# Patient Record
Sex: MALE | Race: WHITE | NOT HISPANIC OR LATINO | Employment: UNEMPLOYED | URBAN - METROPOLITAN AREA
[De-identification: names, ages, dates, MRNs, and addresses within clinical notes are randomized per-mention and may not be internally consistent; named-entity substitution may affect disease eponyms.]

---

## 2019-06-11 RX ORDER — IBUPROFEN 800 MG/1
800 TABLET ORAL EVERY 6 HOURS PRN
COMMUNITY
End: 2021-10-29

## 2019-06-11 RX ORDER — FLUTICASONE PROPIONATE 50 MCG
2 SPRAY, SUSPENSION (ML) NASAL DAILY
COMMUNITY
End: 2021-10-29

## 2019-06-11 RX ORDER — PREDNISONE 10 MG/1
10 TABLET ORAL DAILY
COMMUNITY
End: 2021-10-29

## 2019-06-11 RX ORDER — LOSARTAN POTASSIUM AND HYDROCHLOROTHIAZIDE 25; 100 MG/1; MG/1
1 TABLET ORAL DAILY
COMMUNITY
End: 2021-10-29

## 2019-06-11 RX ORDER — AMLODIPINE BESYLATE 5 MG/1
5 TABLET ORAL DAILY
COMMUNITY
End: 2021-10-29

## 2019-06-11 RX ORDER — LOSARTAN POTASSIUM 50 MG/1
50 TABLET ORAL DAILY
COMMUNITY
End: 2021-10-29

## 2019-06-12 ENCOUNTER — OFFICE VISIT (OUTPATIENT)
Dept: NEUROSURGERY | Facility: CLINIC | Age: 49
End: 2019-06-12

## 2019-06-12 VITALS — HEIGHT: 73 IN | BODY MASS INDEX: 37.64 KG/M2 | TEMPERATURE: 97.5 F | WEIGHT: 284 LBS

## 2019-06-12 DIAGNOSIS — M54.14 THORACIC RADICULOPATHY: ICD-10-CM

## 2019-06-12 DIAGNOSIS — M47.816 FACET ARTHRITIS OF LUMBAR REGION: ICD-10-CM

## 2019-06-12 DIAGNOSIS — M48.062 LUMBAR STENOSIS WITH NEUROGENIC CLAUDICATION: ICD-10-CM

## 2019-06-12 DIAGNOSIS — M51.16 HERNIATION OF LUMBAR INTERVERTEBRAL DISC WITH RADICULOPATHY: Primary | ICD-10-CM

## 2019-06-12 DIAGNOSIS — M51.36 DEGENERATIVE DISC DISEASE, LUMBAR: ICD-10-CM

## 2019-06-12 DIAGNOSIS — M54.12 CERVICAL RADICULOPATHY: ICD-10-CM

## 2019-06-12 PROCEDURE — 99243 OFF/OP CNSLTJ NEW/EST LOW 30: CPT | Performed by: NEUROLOGICAL SURGERY

## 2019-06-12 RX ORDER — CLONIDINE HYDROCHLORIDE 0.1 MG/1
TABLET ORAL
Refills: 0 | COMMUNITY
Start: 2019-06-07 | End: 2021-10-29

## 2019-06-12 NOTE — PROGRESS NOTES
Patient: Suresh Johns  : 1970    Primary Care Provider: Iris Barros APRN    Requesting Provider: As above      History    Chief Complaint: Chronic low back pain with sensory alteration.    History of Present Illness: Mr. 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  He was off work for about 1.5 years.  He returned to work and has had ongoing symptoms.  These have been worse since the fall of last year.  Over the last several weeks his symptoms have increased even more.  His pain that extends into the side of the left thigh.  He has numbness globally from the right axilla down to the right foot.  He also complains of numbness and tingling in the ulnar fingers of each hand.  He has no bowel or bladder dysfunction.  His symptoms are worse with walking and standing.  Being off his feet is modestly helpful.    Review of Systems   Constitutional: Positive for activity change. Negative for appetite change, chills, diaphoresis, fatigue, fever and unexpected weight change.   HENT: Negative for congestion, dental problem, drooling, ear discharge, ear pain, facial swelling, hearing loss, mouth sores, nosebleeds, postnasal drip, rhinorrhea, sinus pressure, sinus pain, sneezing, sore throat, tinnitus, trouble swallowing and voice change.    Eyes: Negative for photophobia, pain, discharge, redness, itching and visual disturbance.   Respiratory: Negative for apnea, cough, choking, chest tightness, shortness of breath, wheezing and stridor.    Cardiovascular: Positive for chest pain. Negative for palpitations and leg swelling.   Gastrointestinal: Negative for abdominal distention, abdominal pain, anal bleeding, blood in stool, constipation, diarrhea, nausea, rectal pain and vomiting.   Endocrine: Negative for cold intolerance, heat intolerance, polydipsia, polyphagia and polyuria.  "  Genitourinary: Negative for decreased urine volume, difficulty urinating, discharge, dysuria, enuresis, flank pain, frequency, genital sores, hematuria, penile pain, penile swelling, scrotal swelling, testicular pain and urgency.   Musculoskeletal: Positive for arthralgias, back pain and gait problem. Negative for joint swelling, myalgias, neck pain and neck stiffness.   Skin: Negative for color change, pallor, rash and wound.   Allergic/Immunologic: Negative for environmental allergies, food allergies and immunocompromised state.   Neurological: Positive for weakness. Negative for dizziness, tremors, seizures, syncope, facial asymmetry, speech difficulty, light-headedness, numbness and headaches.   Hematological: Negative for adenopathy. Does not bruise/bleed easily.   Psychiatric/Behavioral: Positive for dysphoric mood. Negative for agitation, behavioral problems, confusion, decreased concentration, hallucinations, self-injury, sleep disturbance and suicidal ideas. The patient is not nervous/anxious and is not hyperactive.        The patient's past medical history, past surgical history, family history, and social history have been reviewed at length in the electronic medical record.    Physical Exam:   Temp 97.5 °F (36.4 °C) (Temporal)   Ht 185.4 cm (73\")   Wt 129 kg (284 lb)   BMI 37.47 kg/m²   CONSTITUTIONAL: Patient is well-nourished, pleasant and appears stated age.  CV: Heart regular rate and rhythm without murmur, rub, or gallop.  PULMONARY: Lungs are clear to ascultation.  MUSCULOSKELETAL:  Straight leg raising is negative.  Benjie's Sign is negative.  ROM in back normal.  Tenderness in the back to palpation is not observed.  NEUROLOGICAL:  Orientation, memory, attention span, language function, and cognition have been examined and are intact.  Strength is intact in the lower extremities to direct testing.  Muscle tone is normal throughout.  Station and gait are normal.  Sensation is diminished " somewhat vaguely in the right chest abdomen and right leg.  Deep tendon reflexes are 2+ in the left knee and left ankle, absent of the right knee, and 1+ at the right ankle.  Connie signs are negative.  Coordination is intact.    Medical Decision Making    Data Review:   MRI of the lumbar spine dated 5/23/2019 demonstrates diffuse degenerative disc disease and facet arthropathy.  Generous disc protrusion central to leftward helps to produce significant stenosis at L3-4.  There is some broad-based disc bulging more prominent rightward at L4-5.  Similar findings are noted more leftward at L5-S1.    Diagnosis:   The patient may well be symptomatic from the disc herniation and stenosis at L3-4.  I cannot readily explain the numbness he harbors from the right axilla downward.    Treatment Options:   I have referred the patient for an MRI of the cervical and thoracic spine to make sure that there are no other lesions that would explain some of his numbness.  Studies apparently have demonstrated aortic aneurysms and he is due to see a CT surgeon as well.       Diagnosis Plan   1. Herniation of lumbar intervertebral disc with radiculopathy     2. Degenerative disc disease, lumbar     3. Facet arthritis of lumbar region (CMS/HCC)     4. Thoracic radiculopathy  MRI Thoracic Spine Without Contrast   5. Cervical radiculopathy  MRI Cervical Spine Without Contrast   6. Lumbar stenosis with neurogenic claudication         Scribed for Ebenezer Latham MD by Mar Stanford CMA on 06/12/2019 at 1:57 PM      I, Dr. Latham, personally performed the services described in the documentation, as scribed in my presence, and it is both accurate and complete.

## 2021-09-08 ENCOUNTER — TELEPHONE (OUTPATIENT)
Dept: NEUROSURGERY | Facility: CLINIC | Age: 51
End: 2021-09-08

## 2021-09-08 NOTE — TELEPHONE ENCOUNTER
RECEIVED REFERRAL THROUGH [facility]. ALREADY AN ESTABLISHED PATIENT, LAST SAW DR. KITCHEN 06- FOR SAME DX. MRI LUMBAR SPINE 08-10-21 IN CHART. PLEASE REVIEW AND ADVISE ON SCHEDULING.

## 2021-10-29 ENCOUNTER — OFFICE VISIT (OUTPATIENT)
Dept: NEUROSURGERY | Facility: CLINIC | Age: 51
End: 2021-10-29

## 2021-10-29 VITALS — WEIGHT: 302.4 LBS | HEIGHT: 73 IN | TEMPERATURE: 97.7 F | BODY MASS INDEX: 40.08 KG/M2

## 2021-10-29 DIAGNOSIS — R29.2: ICD-10-CM

## 2021-10-29 DIAGNOSIS — M51.36 DEGENERATIVE DISC DISEASE, LUMBAR: Primary | ICD-10-CM

## 2021-10-29 DIAGNOSIS — E66.9 OBESITY (BMI 30-39.9): ICD-10-CM

## 2021-10-29 DIAGNOSIS — M48.062 SPINAL STENOSIS, LUMBAR REGION, WITH NEUROGENIC CLAUDICATION: ICD-10-CM

## 2021-10-29 DIAGNOSIS — M51.26 HNP (HERNIATED NUCLEUS PULPOSUS), LUMBAR: ICD-10-CM

## 2021-10-29 DIAGNOSIS — M54.59 MECHANICAL LOW BACK PAIN: ICD-10-CM

## 2021-10-29 PROCEDURE — 99214 OFFICE O/P EST MOD 30 MIN: CPT | Performed by: PHYSICIAN ASSISTANT

## 2021-10-29 RX ORDER — LISINOPRIL 2.5 MG/1
2.5 TABLET ORAL DAILY
COMMUNITY
End: 2021-12-01

## 2021-10-29 RX ORDER — HYDROCODONE BITARTRATE AND ACETAMINOPHEN 7.5; 325 MG/1; MG/1
1 TABLET ORAL 3 TIMES DAILY PRN
COMMUNITY

## 2021-10-29 NOTE — PATIENT INSTRUCTIONS

## 2021-10-29 NOTE — PROGRESS NOTES
Patient: Suresh Johns  : 1970  GENDER: male    Primary Care Provider: Iris Barros APRN    Requesting Provider: As above      History    Chief Complaint:   1. Neck and bilateral arm pain with sensory alteration   2. Low back pain with neurogenic claudication     History of Present Illness: Mr. 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  Patient presents to our service with a protracted course of low back difficulties after sustaining a work-related injury - where he fell off a semitruck in .  Symptoms are predominately confined to his low back and increase rather immediately upon standing/walking, and are improved with sitting/rest.  He denies bilateral lower extremity sensory alteration, however he reports weakness with bilateral hip flexion, and his gait has been off recently.  He additionally endorses posterior neck pain with sensory alteration extending bilaterally down his arms along the ulnar nerve distribution.  Symptoms in his bilateral upper extremities are constant and do not appear to be activity dependent.  He has undergone 12+ sessions of physical therapy without lasting benefit.  He is followed by pain management and receives Norco 7.5 TID.  He denies bowel or bladder dysfunction.  He has no other complaints at this time.    Review of Systems   Constitutional: Positive for activity change. Negative for appetite change, chills, diaphoresis, fatigue, fever and unexpected weight change.   HENT: Positive for ear pain and tinnitus. Negative for congestion, dental problem, drooling, ear discharge, facial swelling, hearing loss, mouth sores, nosebleeds, postnasal drip, rhinorrhea, sinus pressure, sinus pain, sneezing, sore throat, trouble swallowing and voice change.    Eyes: Negative for photophobia, pain, discharge, redness, itching and visual disturbance.   Respiratory: Negative for  apnea, cough, choking, chest tightness, shortness of breath, wheezing and stridor.    Cardiovascular: Positive for palpitations. Negative for chest pain and leg swelling.   Gastrointestinal: Negative for abdominal distention, abdominal pain, anal bleeding, blood in stool, constipation, diarrhea, nausea, rectal pain and vomiting.   Endocrine: Negative for cold intolerance, heat intolerance, polydipsia, polyphagia and polyuria.   Genitourinary: Negative for decreased urine volume, difficulty urinating, dysuria, enuresis, flank pain, frequency, genital sores, hematuria and urgency.   Musculoskeletal: Positive for back pain and neck pain. Negative for arthralgias, gait problem, joint swelling, myalgias and neck stiffness.   Skin: Negative for color change, pallor, rash and wound.   Allergic/Immunologic: Negative for environmental allergies, food allergies and immunocompromised state.   Neurological: Positive for headaches. Negative for dizziness, tremors, seizures, syncope, facial asymmetry, speech difficulty, weakness, light-headedness and numbness.   Hematological: Negative for adenopathy. Does not bruise/bleed easily.   Psychiatric/Behavioral: Positive for sleep disturbance. Negative for agitation, behavioral problems, confusion, decreased concentration, dysphoric mood, hallucinations, self-injury and suicidal ideas. The patient is not nervous/anxious and is not hyperactive.    All other systems reviewed and are negative.      Past Medical History:   Diagnosis Date   • Angina pectoris with documented spasm (HCC)    • Aortic aneurysm (HCC)    • Arthritis    • Back pain    • Back problem    • Cellulitis of right upper limb    • Hypertension    • Low back pain    • Other infective otitis externa, bilateral    • Otitis media, unspecified, right ear    • Radiculopathy     Lumbar Region     Past Surgical History:   Procedure Laterality Date   • NO PAST SURGERIES  10/29/2021       Current Outpatient Medications:   •   "HYDROcodone-acetaminophen (NORCO) 7.5-325 MG per tablet, Take 1 tablet by mouth 3 (Three) Times a Day As Needed for Moderate Pain ., Disp: , Rfl:   •  lisinopril (PRINIVIL,ZESTRIL) 2.5 MG tablet, Take 2.5 mg by mouth Daily., Disp: , Rfl:     Allergies   Allergen Reactions   • Ciprofloxacin Hives and Swelling       The patient's review of systems, past medical history, past surgical history, family history, and social history have been reviewed at length in the electronic medical record.    Physical Exam:   Temp 97.7 °F (36.5 °C)   Ht 185.4 cm (73\")   Wt (!) 137 kg (302 lb 6.4 oz)   BMI 39.90 kg/m²   CONSTITUTIONAL:   - Patient is well-nourished  - Pleasant and appears stated age.  PSYCHIATRIC:  - Normal mood and affect  - Behavior is normal.  - Thought content is normal  HENT:   Head: Normocephalic and Atraumatic.   Eyes:     - Pupils are equal, round, and reactive to light.     - EOM are normal.   CV:   - Regular rate and rhythm on palpable radial pulse   - No murmur appreciated   PULMONARY:   - Speaking in full sentences  - No use of accessory muscles   - Breathing is non-labored   - No wheezing   SKIN:  - Clean, dry and intact   MUSCULOSKELETAL:  - Back tenderness to palpation is not observed.   - ROM in neck/back is normal.  - Straight leg raise is negative   - Benjie's sign is negative   NEUROLOGICAL:  - A&Ox3  - Attention span, language function, and cognition are intact.  - Strength is intact in the upper and lower extremities to direct testing.  - Muscle tone is normal throughout.  - Station and gait are normal.  - Sensation is intact to light touch testing throughout.  - Deep tendon reflexes are 1+ and symmetrical, and difficult to elicit in his right patellar.    - Harrison's Sign is positive bilaterally.  - No clonus is elicited.  - Coordination is intact.    Patient's Body mass index is 39.9 kg/m². indicating that he is obese (BMI >30). Obesity-related health conditions include the following: " hypertension. Obesity is unchanged. BMI is is above average; BMI management plan is completed. We discussed portion control and increasing exercise..         Medical Decision Making    Data Review:   1. MRI Lumbar Spine (8/03/2021):  Independent review of radiographic imaging demonstrates multilevel degenerative disc disease greatest at L3-4 where there is a central to leftward disc protrusion producing significant central stenosis.  At L4-5 there is bilateral foraminal narrowing, and at L5-S1 there is a central broad-based disc protrusion producing moderate central narrowing.    Imaging was reviewed with Dr. Latham.    Diagnosis/Treatment Options:  1. Degenerative disc disease, lumbar  2. Spinal stenosis, lumbar region, with neurogenic claudication  3. Mechanical low back pain  4. HNP (herniated nucleus pulposus), lumbar  5. Harrison's reflex positive  6. Obesity (BMI 30-39.9)    - MRI Cervical Spine Without Contrast       Follow up:  Mr. Johns is seen today with low back complaints and he does appear to be claudicating.  Given the positive Connie's bilaterally I have ordered an MRI of the cervical spine to rule out findings concerning for myelopathy.  Once his cervical spine is cleared we will address his lower extremity complaints.    Ericka Dickens PA-C   10/29/2021   15:07 EDT

## 2021-12-01 ENCOUNTER — DOCUMENTATION (OUTPATIENT)
Dept: NEUROSURGERY | Facility: CLINIC | Age: 51
End: 2021-12-01

## 2021-12-01 ENCOUNTER — OFFICE VISIT (OUTPATIENT)
Dept: NEUROSURGERY | Facility: CLINIC | Age: 51
End: 2021-12-01

## 2021-12-01 VITALS — WEIGHT: 302.2 LBS | TEMPERATURE: 97.1 F | BODY MASS INDEX: 40.05 KG/M2 | HEIGHT: 73 IN

## 2021-12-01 DIAGNOSIS — E66.9 OBESITY (BMI 30-39.9): ICD-10-CM

## 2021-12-01 DIAGNOSIS — M47.12 CERVICAL SPONDYLOSIS WITH MYELOPATHY: Primary | ICD-10-CM

## 2021-12-01 DIAGNOSIS — M51.26 HNP (HERNIATED NUCLEUS PULPOSUS), LUMBAR: ICD-10-CM

## 2021-12-01 DIAGNOSIS — M54.59 MECHANICAL LOW BACK PAIN: ICD-10-CM

## 2021-12-01 DIAGNOSIS — R29.2: ICD-10-CM

## 2021-12-01 DIAGNOSIS — M54.12 CERVICAL RADICULOPATHY: ICD-10-CM

## 2021-12-01 DIAGNOSIS — M48.062 SPINAL STENOSIS, LUMBAR REGION, WITH NEUROGENIC CLAUDICATION: ICD-10-CM

## 2021-12-01 PROCEDURE — 99214 OFFICE O/P EST MOD 30 MIN: CPT | Performed by: PHYSICIAN ASSISTANT

## 2021-12-01 RX ORDER — FAMOTIDINE 10 MG
20 TABLET ORAL
Status: CANCELLED | OUTPATIENT
Start: 2021-12-01

## 2021-12-01 RX ORDER — SODIUM CHLORIDE, SODIUM LACTATE, POTASSIUM CHLORIDE, CALCIUM CHLORIDE 600; 310; 30; 20 MG/100ML; MG/100ML; MG/100ML; MG/100ML
100 INJECTION, SOLUTION INTRAVENOUS CONTINUOUS
Status: CANCELLED | OUTPATIENT
Start: 2021-12-01

## 2021-12-01 NOTE — PROGRESS NOTES
Patient: Suresh Johns  : 1970  GENDER: male    Primary Care Provider: Iris Barros APRN    Requesting Provider: As above      History    Chief Complaint:   1. Neck and bilateral arm pain with sensory alteration   2. 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  Symptoms are predominately confined to his low back and increase rather immediately upon standing/walking, and are improved with sitting/rest.  He denies bilateral lower extremity sensory alteration, however he reports weakness with bilateral hip flexion, and his gait has been off since .  He additionally endorses posterior neck pain with sensory alteration extending bilaterally down his arms along the ulnar nerve distribution.  Symptoms in his bilateral upper extremities are constant and do not appear to be activity dependent.  He has undergone 12+ sessions of physical therapy without lasting benefit.  He is followed by pain management and receives Norco 7.5 TID.  He denies bowel or bladder dysfunction.  He has no other complaints at this time.    Review of Systems   Constitutional: Negative for activity change, appetite change, chills, diaphoresis, fatigue, fever and unexpected weight change.   HENT: Negative for congestion, dental problem, drooling, ear discharge, ear pain, facial swelling, hearing loss, mouth sores, nosebleeds, postnasal drip, rhinorrhea, sinus pressure, sinus pain, sneezing, sore throat, tinnitus, trouble swallowing and voice change.    Eyes: Negative for photophobia, pain, discharge, redness, itching and visual disturbance.   Respiratory: Negative for apnea, cough, choking,  chest tightness, shortness of breath, wheezing and stridor.    Cardiovascular: Negative for chest pain, palpitations and leg swelling.   Gastrointestinal: Negative for abdominal distention, abdominal pain, anal bleeding, blood in stool, constipation, diarrhea, nausea, rectal pain and vomiting.   Endocrine: Negative for cold intolerance, heat intolerance, polydipsia, polyphagia and polyuria.   Genitourinary: Negative for decreased urine volume, difficulty urinating, dysuria, enuresis, flank pain, frequency, genital sores, hematuria and urgency.   Musculoskeletal: Negative for arthralgias, back pain, gait problem, joint swelling, myalgias, neck pain and neck stiffness.   Skin: Negative for color change, pallor, rash and wound.   Allergic/Immunologic: Negative for environmental allergies, food allergies and immunocompromised state.   Neurological: Negative for dizziness, tremors, seizures, syncope, facial asymmetry, speech difficulty, weakness, light-headedness, numbness and headaches.   Hematological: Negative for adenopathy. Does not bruise/bleed easily.   Psychiatric/Behavioral: Negative for agitation, behavioral problems, confusion, decreased concentration, dysphoric mood, hallucinations, self-injury, sleep disturbance and suicidal ideas. The patient is not nervous/anxious and is not hyperactive.    All other systems reviewed and are negative.      Past Medical History:   Diagnosis Date   • Angina pectoris with documented spasm (HCC)    • Aortic aneurysm (HCC)    • Arthritis    • Back pain    • Back problem    • Cellulitis of right upper limb    • Hypertension    • Low back pain    • Other infective otitis externa, bilateral    • Otitis media, unspecified, right ear    • Radiculopathy     Lumbar Region     Past Surgical History:   Procedure Laterality Date   • NO PAST SURGERIES  10/29/2021       Current Outpatient Medications:   •  HYDROcodone-acetaminophen (NORCO) 7.5-325 MG per tablet, Take 1 tablet by mouth 3  "(Three) Times a Day As Needed for Moderate Pain ., Disp: , Rfl:     Allergies   Allergen Reactions   • Ciprofloxacin Hives and Swelling       The patient's review of systems, past medical history, past surgical history, family history, and social history have been reviewed at length in the electronic medical record.    Physical Exam:   Temp 97.1 °F (36.2 °C)   Ht 185.4 cm (72.99\")   Wt (!) 137 kg (302 lb 3.2 oz)   BMI 39.88 kg/m²    CONSTITUTIONAL:   - Patient is well-nourished  - Pleasant and appears stated age.  PSYCHIATRIC:  - Normal mood and affect  - Behavior is normal.  - Thought content is normal  HENT:   Head: Normocephalic and Atraumatic.   Eyes:     - Pupils are equal, round, and reactive to light.     - EOM are normal.   CV:   - Regular rate and rhythm on palpable radial pulse   - No murmur appreciated   PULMONARY:   - Speaking in full sentences  - No use of accessory muscles   - Breathing is non-labored   - No wheezing   SKIN:  - Clean, dry and intact   MUSCULOSKELETAL:  - Back tenderness to palpation is not observed.   - ROM in neck/back is normal.  - Straight leg raise is negative   - Benjie's sign is negative   NEUROLOGICAL:  - A&Ox3  - Attention span, language function, and cognition are intact.  - Strength is intact in the upper and lower extremities to direct testing.  - Muscle tone is normal throughout.  - Station and gait are normal.  - Sensation is intact to light touch testing throughout.  - Deep tendon reflexes are 2-3+ and symmetrical, and difficult to elicit in his right patellar.    - Harrison's Sign is positive bilaterally.  - No clonus is elicited.  - Coordination is intact.    Patient's Body mass index is 39.88 kg/m². indicating that he is morbidly obese (BMI > 40 or > 35 with obesity - related health condition). Obesity-related health conditions include the following: hypertension. Obesity is unchanged. BMI is is above average; BMI management plan is completed. We discussed portion " control and increasing exercise..         Medical Decision Making    Data Review:   1. MRI Cervical Spine (11/12/2021):  Independent review of radiographic imaging demonstrates multilevel degenerative and spondylolytic changes greatest at C5-6 where there is severe central stenosis and malik signal change within the cord.      2. MRI Lumbar Spine (8/03/2021):  Independent review of radiographic imaging demonstrates multilevel degenerative disc disease greatest at L3-4 where there is a central to leftward disc protrusion producing significant central stenosis.  At L4-5 there is bilateral foraminal narrowing, and at L5-S1 there is a central broad-based disc protrusion producing moderate central narrowing.     Imaging was reviewed with Dr. Latham      Diagnosis/Treatment Options:  1. Cervical spondylosis with myelopathy  2. Cervical radiculopathy  3. Harrison's reflex positive  4. Spinal stenosis, lumbar region, with neurogenic claudication  5. Mechanical low back pain  6. HNP (herniated nucleus pulposus), lumbar  7. Obesity (BMI 30-39.9)    - Case Request; Standing  - COVID PRE-OP / PRE-PROCEDURE SCREENING ORDER (NO ISOLATION) - Swab, Nasopharynx; Future  - CBC and Differential; Future  - Basic metabolic panel; Future  - Nicotine Screen, Urine - Urine, Clean Catch; Future  - Case Request       Follow up:  Mr. Johns is seen today in follow-up with an MRI of the cervical spine that reveals severe stenosis at C5-6 with signal change within the cord.  He is noted to be frankly myelopathic on physical exam.  After discussing my findings with Dr. Latham, we have recommended ACDF at C5-6 to address his myelopathy.  I explained the procedure in detail including potential risks, complications, limitations and alternative to surgery.  Pt. Verbalized understanding and has agreed to proceed.  Of note, he has decreased his smoking to 6-8 cigarettes a day and is willing to quit all together.      Ericka Dickens PA-C    12/1/2021   11:34 EST

## 2021-12-01 NOTE — H&P
Patient: Suresh Johns  : 1970  GENDER: male    Primary Care Provider: Iris Barros APRN    Requesting Provider: As above      History    Chief Complaint:   1. Neck and bilateral arm pain with sensory alteration   2. Low back pain with neurogenic claudication     History of Present Illness: Mr. 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  Symptoms are predominately confined to his low back and increase rather immediately upon standing/walking, and are improved with sitting/rest.  He denies bilateral lower extremity sensory alteration, however he reports weakness with bilateral hip flexion, and his gait has been off since .  He additionally endorses posterior neck pain with sensory alteration extending bilaterally down his arms along the ulnar nerve distribution.  Symptoms in his bilateral upper extremities are constant and do not appear to be activity dependent.  He has undergone 12+ sessions of physical therapy without lasting benefit.  He is followed by pain management and receives Norco 7.5 TID.  He denies bowel or bladder dysfunction.  He has no other complaints at this time.    Review of Systems   Constitutional: Negative for activity change, appetite change, chills, diaphoresis, fatigue, fever and unexpected weight change.   HENT: Negative for congestion, dental problem, drooling, ear discharge, ear pain, facial swelling, hearing loss, mouth sores, nosebleeds, postnasal drip, rhinorrhea, sinus pressure, sinus pain, sneezing, sore throat, tinnitus, trouble swallowing and voice change.    Eyes: Negative for photophobia, pain, discharge, redness, itching and visual disturbance.   Respiratory: Negative for apnea, cough, choking,  chest tightness, shortness of breath, wheezing and stridor.    Cardiovascular: Negative for chest pain, palpitations and leg swelling.   Gastrointestinal: Negative for abdominal distention, abdominal pain, anal bleeding, blood in stool, constipation, diarrhea, nausea, rectal pain and vomiting.   Endocrine: Negative for cold intolerance, heat intolerance, polydipsia, polyphagia and polyuria.   Genitourinary: Negative for decreased urine volume, difficulty urinating, dysuria, enuresis, flank pain, frequency, genital sores, hematuria and urgency.   Musculoskeletal: Negative for arthralgias, back pain, gait problem, joint swelling, myalgias, neck pain and neck stiffness.   Skin: Negative for color change, pallor, rash and wound.   Allergic/Immunologic: Negative for environmental allergies, food allergies and immunocompromised state.   Neurological: Negative for dizziness, tremors, seizures, syncope, facial asymmetry, speech difficulty, weakness, light-headedness, numbness and headaches.   Hematological: Negative for adenopathy. Does not bruise/bleed easily.   Psychiatric/Behavioral: Negative for agitation, behavioral problems, confusion, decreased concentration, dysphoric mood, hallucinations, self-injury, sleep disturbance and suicidal ideas. The patient is not nervous/anxious and is not hyperactive.    All other systems reviewed and are negative.      Past Medical History:   Diagnosis Date   • Angina pectoris with documented spasm (HCC)    • Aortic aneurysm (HCC)    • Arthritis    • Back pain    • Back problem    • Cellulitis of right upper limb    • Hypertension    • Low back pain    • Other infective otitis externa, bilateral    • Otitis media, unspecified, right ear    • Radiculopathy     Lumbar Region     Past Surgical History:   Procedure Laterality Date   • NO PAST SURGERIES  10/29/2021       Current Outpatient Medications:   •  HYDROcodone-acetaminophen (NORCO) 7.5-325 MG per tablet, Take 1 tablet by mouth 3  "(Three) Times a Day As Needed for Moderate Pain ., Disp: , Rfl:     Allergies   Allergen Reactions   • Ciprofloxacin Hives and Swelling       The patient's review of systems, past medical history, past surgical history, family history, and social history have been reviewed at length in the electronic medical record.    Physical Exam:   Temp 97.1 °F (36.2 °C)   Ht 185.4 cm (72.99\")   Wt (!) 137 kg (302 lb 3.2 oz)   BMI 39.88 kg/m²    CONSTITUTIONAL:   - Patient is well-nourished  - Pleasant and appears stated age.  PSYCHIATRIC:  - Normal mood and affect  - Behavior is normal.  - Thought content is normal  HENT:   Head: Normocephalic and Atraumatic.   Eyes:     - Pupils are equal, round, and reactive to light.     - EOM are normal.   CV:   - Regular rate and rhythm on palpable radial pulse   - No murmur appreciated   PULMONARY:   - Speaking in full sentences  - No use of accessory muscles   - Breathing is non-labored   - No wheezing   SKIN:  - Clean, dry and intact   MUSCULOSKELETAL:  - Back tenderness to palpation is not observed.   - ROM in neck/back is normal.  - Straight leg raise is negative   - Benjie's sign is negative   NEUROLOGICAL:  - A&Ox3  - Attention span, language function, and cognition are intact.  - Strength is intact in the upper and lower extremities to direct testing.  - Muscle tone is normal throughout.  - Station and gait are normal.  - Sensation is intact to light touch testing throughout.  - Deep tendon reflexes are 2-3+ and symmetrical, and difficult to elicit in his right patellar.    - Harrison's Sign is positive bilaterally.  - No clonus is elicited.  - Coordination is intact.    Patient's Body mass index is 39.88 kg/m². indicating that he is morbidly obese (BMI > 40 or > 35 with obesity - related health condition). Obesity-related health conditions include the following: hypertension. Obesity is unchanged. BMI is is above average; BMI management plan is completed. We discussed portion " control and increasing exercise..         Medical Decision Making    Data Review:   1. MRI Cervical Spine (11/12/2021):  Independent review of radiographic imaging demonstrates multilevel degenerative and spondylolytic changes greatest at C5-6 where there is severe central stenosis and malik signal change within the cord.      2. MRI Lumbar Spine (8/03/2021):  Independent review of radiographic imaging demonstrates multilevel degenerative disc disease greatest at L3-4 where there is a central to leftward disc protrusion producing significant central stenosis.  At L4-5 there is bilateral foraminal narrowing, and at L5-S1 there is a central broad-based disc protrusion producing moderate central narrowing.     Imaging was reviewed with Dr. Latham      Diagnosis/Treatment Options:  1. Cervical spondylosis with myelopathy  2. Cervical radiculopathy  3. Harrison's reflex positive  4. Spinal stenosis, lumbar region, with neurogenic claudication  5. Mechanical low back pain  6. HNP (herniated nucleus pulposus), lumbar  7. Obesity (BMI 30-39.9)    - Case Request; Standing  - COVID PRE-OP / PRE-PROCEDURE SCREENING ORDER (NO ISOLATION) - Swab, Nasopharynx; Future  - CBC and Differential; Future  - Basic metabolic panel; Future  - Nicotine Screen, Urine - Urine, Clean Catch; Future  - Case Request       Follow up:  Mr. Johns is seen today in follow-up with an MRI of the cervical spine that reveals severe stenosis at C5-6 with signal change within the cord.  He is noted to be frankly myelopathic on physical exam.  After discussing my findings with Dr. Latham, we have recommended ACDF at C5-6 to address his myelopathy.  I explained the procedure in detail including potential risks, complications, limitations and alternative to surgery.  Pt. Verbalized understanding and has agreed to proceed.  Of note, he has decreased his smoking to 6-8 cigarettes a day and is willing to quit all together.      Ericka Dickens PA-C    12/1/2021   11:34 EST

## 2021-12-08 DIAGNOSIS — R29.2: ICD-10-CM

## 2021-12-08 DIAGNOSIS — M47.12 CERVICAL SPONDYLOSIS WITH MYELOPATHY: Primary | ICD-10-CM

## 2021-12-08 DIAGNOSIS — M48.062 SPINAL STENOSIS, LUMBAR REGION, WITH NEUROGENIC CLAUDICATION: ICD-10-CM

## 2021-12-08 DIAGNOSIS — M54.12 CERVICAL RADICULOPATHY: ICD-10-CM

## 2021-12-08 DIAGNOSIS — M54.59 MECHANICAL LOW BACK PAIN: ICD-10-CM

## 2021-12-08 RX ORDER — CHLORHEXIDINE GLUCONATE 4 G/100ML
SOLUTION TOPICAL
Qty: 120 ML | Refills: 0 | Status: SHIPPED | OUTPATIENT
Start: 2021-12-08

## 2021-12-30 ENCOUNTER — TELEPHONE (OUTPATIENT)
Dept: NEUROSURGERY | Facility: CLINIC | Age: 51
End: 2021-12-30

## 2021-12-30 NOTE — TELEPHONE ENCOUNTER
I spoke to Gila at [facility] medical records to request cervical MRI report.     Received faxed MRI report. I will give to a  to scan into the chart.

## 2022-01-07 ENCOUNTER — PRE-ADMISSION TESTING (OUTPATIENT)
Dept: PREADMISSION TESTING | Facility: HOSPITAL | Age: 52
End: 2022-01-07

## 2022-01-07 RX ORDER — FLUTICASONE PROPIONATE 50 MCG
2 SPRAY, SUSPENSION (ML) NASAL ONCE AS NEEDED
COMMUNITY